# Patient Record
Sex: MALE | Race: WHITE | NOT HISPANIC OR LATINO | Employment: OTHER | URBAN - METROPOLITAN AREA
[De-identification: names, ages, dates, MRNs, and addresses within clinical notes are randomized per-mention and may not be internally consistent; named-entity substitution may affect disease eponyms.]

---

## 2017-07-11 ENCOUNTER — FOLLOW UP (OUTPATIENT)
Dept: URBAN - METROPOLITAN AREA CLINIC 33 | Facility: CLINIC | Age: 82
End: 2017-07-11

## 2017-07-11 VITALS
WEIGHT: 160 LBS | DIASTOLIC BLOOD PRESSURE: 62 MMHG | BODY MASS INDEX: 23.7 KG/M2 | HEART RATE: 60 BPM | HEIGHT: 69 IN | SYSTOLIC BLOOD PRESSURE: 112 MMHG

## 2017-07-11 DIAGNOSIS — H01.006: ICD-10-CM

## 2017-07-11 DIAGNOSIS — H35.54: ICD-10-CM

## 2017-07-11 DIAGNOSIS — H35.373: ICD-10-CM

## 2017-07-11 DIAGNOSIS — H02.833: ICD-10-CM

## 2017-07-11 DIAGNOSIS — H25.11: ICD-10-CM

## 2017-07-11 DIAGNOSIS — H02.836: ICD-10-CM

## 2017-07-11 DIAGNOSIS — H01.003: ICD-10-CM

## 2017-07-11 DIAGNOSIS — H35.3112: ICD-10-CM

## 2017-07-11 DIAGNOSIS — H04.123: ICD-10-CM

## 2017-07-11 DIAGNOSIS — H43.812: ICD-10-CM

## 2017-07-11 DIAGNOSIS — H35.3124: ICD-10-CM

## 2017-07-11 PROCEDURE — 92250 FUNDUS PHOTOGRAPHY W/I&R: CPT

## 2017-07-11 PROCEDURE — 2019F DILATED MACUL EXAM DONE: CPT

## 2017-07-11 PROCEDURE — 4177F TALK PT/CRGVR RE AREDS PREV: CPT

## 2017-07-11 PROCEDURE — 92134 CPTRZ OPH DX IMG PST SGM RTA: CPT

## 2017-07-11 PROCEDURE — 1036F TOBACCO NON-USER: CPT

## 2017-07-11 PROCEDURE — 92014 COMPRE OPH EXAM EST PT 1/>: CPT

## 2017-07-11 PROCEDURE — G8420 CALC BMI NORM PARAMETERS: HCPCS

## 2017-07-11 ASSESSMENT — VISUAL ACUITY
OS_SC: 20/200
OS_PH: 20/100+2
OD_PH: 20/80+1
OD_SC: 20/100+1

## 2017-07-11 ASSESSMENT — TONOMETRY
OS_IOP_MMHG: 16
OD_IOP_MMHG: 15

## 2017-09-18 ENCOUNTER — FOLLOW UP (OUTPATIENT)
Dept: URBAN - METROPOLITAN AREA CLINIC 33 | Facility: CLINIC | Age: 82
End: 2017-09-18

## 2017-09-18 VITALS — HEART RATE: 60 BPM | DIASTOLIC BLOOD PRESSURE: 62 MMHG | HEIGHT: 60 IN | SYSTOLIC BLOOD PRESSURE: 98 MMHG

## 2017-09-18 DIAGNOSIS — H01.006: ICD-10-CM

## 2017-09-18 DIAGNOSIS — H25.11: ICD-10-CM

## 2017-09-18 DIAGNOSIS — H02.833: ICD-10-CM

## 2017-09-18 DIAGNOSIS — H02.836: ICD-10-CM

## 2017-09-18 DIAGNOSIS — H04.123: ICD-10-CM

## 2017-09-18 DIAGNOSIS — H43.812: ICD-10-CM

## 2017-09-18 DIAGNOSIS — H35.54: ICD-10-CM

## 2017-09-18 DIAGNOSIS — H35.373: ICD-10-CM

## 2017-09-18 DIAGNOSIS — H01.003: ICD-10-CM

## 2017-09-18 DIAGNOSIS — H35.3221: ICD-10-CM

## 2017-09-18 DIAGNOSIS — H35.3112: ICD-10-CM

## 2017-09-18 PROCEDURE — 92250 FUNDUS PHOTOGRAPHY W/I&R: CPT

## 2017-09-18 PROCEDURE — 67028 INJECTION EYE DRUG: CPT

## 2017-09-18 PROCEDURE — 1036F TOBACCO NON-USER: CPT

## 2017-09-18 PROCEDURE — 4177F TALK PT/CRGVR RE AREDS PREV: CPT

## 2017-09-18 PROCEDURE — G8427 DOCREV CUR MEDS BY ELIG CLIN: HCPCS

## 2017-09-18 PROCEDURE — 2019F DILATED MACUL EXAM DONE: CPT

## 2017-09-18 PROCEDURE — 92014 COMPRE OPH EXAM EST PT 1/>: CPT

## 2017-09-18 ASSESSMENT — TONOMETRY
OS_IOP_MMHG: 12
OD_IOP_MMHG: 15

## 2017-09-18 ASSESSMENT — VISUAL ACUITY
OS_PH: 20/100+2
OS_SC: 20/200+2
OD_SC: 20/100+1
OD_PH: 20/60-2

## 2017-10-23 ENCOUNTER — FOLLOW UP (OUTPATIENT)
Dept: URBAN - METROPOLITAN AREA CLINIC 33 | Facility: CLINIC | Age: 82
End: 2017-10-23

## 2017-10-23 VITALS — HEART RATE: 66 BPM | SYSTOLIC BLOOD PRESSURE: 102 MMHG | DIASTOLIC BLOOD PRESSURE: 54 MMHG | HEIGHT: 60 IN

## 2017-10-23 DIAGNOSIS — H04.123: ICD-10-CM

## 2017-10-23 DIAGNOSIS — H35.373: ICD-10-CM

## 2017-10-23 DIAGNOSIS — H43.812: ICD-10-CM

## 2017-10-23 DIAGNOSIS — H35.54: ICD-10-CM

## 2017-10-23 DIAGNOSIS — H25.11: ICD-10-CM

## 2017-10-23 DIAGNOSIS — H02.836: ICD-10-CM

## 2017-10-23 DIAGNOSIS — H02.833: ICD-10-CM

## 2017-10-23 DIAGNOSIS — H35.3112: ICD-10-CM

## 2017-10-23 DIAGNOSIS — H01.003: ICD-10-CM

## 2017-10-23 DIAGNOSIS — H01.006: ICD-10-CM

## 2017-10-23 DIAGNOSIS — H35.3221: ICD-10-CM

## 2017-10-23 PROCEDURE — 92012 INTRM OPH EXAM EST PATIENT: CPT

## 2017-10-23 PROCEDURE — 67028 INJECTION EYE DRUG: CPT

## 2017-10-23 PROCEDURE — 2019F DILATED MACUL EXAM DONE: CPT

## 2017-10-23 PROCEDURE — G8427 DOCREV CUR MEDS BY ELIG CLIN: HCPCS

## 2017-10-23 PROCEDURE — 92250 FUNDUS PHOTOGRAPHY W/I&R: CPT

## 2017-10-23 PROCEDURE — 4177F TALK PT/CRGVR RE AREDS PREV: CPT

## 2017-10-23 ASSESSMENT — TONOMETRY
OS_IOP_MMHG: 12
OD_IOP_MMHG: 14

## 2017-10-23 ASSESSMENT — VISUAL ACUITY
OS_SC: 20/200+1
OD_SC: 20/100+1
OS_PH: 20/200+2
OD_PH: 20/60+1

## 2017-12-04 ENCOUNTER — CLINICAL PROCEDURE AND DIAGNOSTIC TESTING ONLY (OUTPATIENT)
Dept: URBAN - METROPOLITAN AREA CLINIC 33 | Facility: CLINIC | Age: 82
End: 2017-12-04

## 2017-12-04 DIAGNOSIS — H35.3221: ICD-10-CM

## 2017-12-04 DIAGNOSIS — H35.3112: ICD-10-CM

## 2017-12-04 PROCEDURE — 92134 CPTRZ OPH DX IMG PST SGM RTA: CPT

## 2017-12-04 PROCEDURE — 67028 INJECTION EYE DRUG: CPT

## 2017-12-04 ASSESSMENT — VISUAL ACUITY
OS_SC: 20/200-1
OD_SC: 20/80+1
OS_PH: 20/200+1

## 2017-12-04 ASSESSMENT — TONOMETRY
OS_IOP_MMHG: 14
OD_IOP_MMHG: 12

## 2018-01-15 ENCOUNTER — CLINICAL PROCEDURE AND DIAGNOSTIC TESTING ONLY (OUTPATIENT)
Dept: URBAN - METROPOLITAN AREA CLINIC 33 | Facility: CLINIC | Age: 83
End: 2018-01-15

## 2018-01-15 DIAGNOSIS — H35.3221: ICD-10-CM

## 2018-01-15 DIAGNOSIS — H35.3112: ICD-10-CM

## 2018-01-15 PROCEDURE — 67028 INJECTION EYE DRUG: CPT

## 2018-01-15 PROCEDURE — 92134 CPTRZ OPH DX IMG PST SGM RTA: CPT

## 2018-01-15 ASSESSMENT — TONOMETRY
OS_IOP_MMHG: 13
OD_IOP_MMHG: 14

## 2018-01-15 ASSESSMENT — VISUAL ACUITY
OD_SC: 20/80-2
OS_SC: 20/200-2

## 2018-02-19 ENCOUNTER — FOLLOW UP AND POST INJECTION EVALUATION (OUTPATIENT)
Dept: URBAN - METROPOLITAN AREA CLINIC 33 | Facility: CLINIC | Age: 83
End: 2018-02-19

## 2018-02-19 VITALS
SYSTOLIC BLOOD PRESSURE: 109 MMHG | HEART RATE: 58 BPM | HEIGHT: 69 IN | WEIGHT: 164 LBS | DIASTOLIC BLOOD PRESSURE: 76 MMHG | BODY MASS INDEX: 24.29 KG/M2

## 2018-02-19 DIAGNOSIS — H35.3221: ICD-10-CM

## 2018-02-19 DIAGNOSIS — H35.3112: ICD-10-CM

## 2018-02-19 DIAGNOSIS — H35.373: ICD-10-CM

## 2018-02-19 DIAGNOSIS — H35.54: ICD-10-CM

## 2018-02-19 DIAGNOSIS — H43.812: ICD-10-CM

## 2018-02-19 PROCEDURE — 92014 COMPRE OPH EXAM EST PT 1/>: CPT

## 2018-02-19 PROCEDURE — 67028 INJECTION EYE DRUG: CPT

## 2018-02-19 PROCEDURE — 92134 CPTRZ OPH DX IMG PST SGM RTA: CPT

## 2018-02-19 PROCEDURE — 92250 FUNDUS PHOTOGRAPHY W/I&R: CPT

## 2018-02-19 ASSESSMENT — VISUAL ACUITY
OD_SC: 20/80-1
OS_SC: 20/200-1

## 2018-02-19 ASSESSMENT — TONOMETRY
OS_IOP_MMHG: 12
OD_IOP_MMHG: 13

## 2018-04-03 ENCOUNTER — PROCEDURE ONLY (OUTPATIENT)
Dept: URBAN - METROPOLITAN AREA CLINIC 33 | Facility: CLINIC | Age: 83
End: 2018-04-03

## 2018-04-03 DIAGNOSIS — H35.3112: ICD-10-CM

## 2018-04-03 DIAGNOSIS — H35.3221: ICD-10-CM

## 2018-04-03 PROCEDURE — 67028 INJECTION EYE DRUG: CPT

## 2018-04-03 PROCEDURE — 92134 CPTRZ OPH DX IMG PST SGM RTA: CPT

## 2018-04-03 ASSESSMENT — VISUAL ACUITY
OS_SC: 20/200+1
OD_SC: 20/100+2

## 2018-04-03 ASSESSMENT — TONOMETRY
OS_IOP_MMHG: 12
OD_IOP_MMHG: 12

## 2018-05-15 ENCOUNTER — FOLLOW UP AND POST INJECTION EVALUATION (OUTPATIENT)
Dept: URBAN - METROPOLITAN AREA CLINIC 33 | Facility: CLINIC | Age: 83
End: 2018-05-15

## 2018-05-15 VITALS — DIASTOLIC BLOOD PRESSURE: 81 MMHG | HEIGHT: 60 IN | HEART RATE: 65 BPM | SYSTOLIC BLOOD PRESSURE: 127 MMHG

## 2018-05-15 DIAGNOSIS — H43.812: ICD-10-CM

## 2018-05-15 DIAGNOSIS — H35.3112: ICD-10-CM

## 2018-05-15 DIAGNOSIS — H35.373: ICD-10-CM

## 2018-05-15 DIAGNOSIS — H35.3221: ICD-10-CM

## 2018-05-15 DIAGNOSIS — H35.54: ICD-10-CM

## 2018-05-15 PROCEDURE — 92250 FUNDUS PHOTOGRAPHY W/I&R: CPT

## 2018-05-15 PROCEDURE — 67028 INJECTION EYE DRUG: CPT

## 2018-05-15 PROCEDURE — 92012 INTRM OPH EXAM EST PATIENT: CPT

## 2018-05-15 PROCEDURE — 92134 CPTRZ OPH DX IMG PST SGM RTA: CPT

## 2018-05-15 ASSESSMENT — TONOMETRY
OS_IOP_MMHG: 10
OD_IOP_MMHG: 11

## 2018-05-15 ASSESSMENT — VISUAL ACUITY
OS_SC: 20/200-1
OD_SC: 20/80

## 2018-06-26 ENCOUNTER — CLINICAL PROCEDURE AND DIAGNOSTIC TESTING ONLY (OUTPATIENT)
Dept: URBAN - METROPOLITAN AREA CLINIC 33 | Facility: CLINIC | Age: 83
End: 2018-06-26

## 2018-06-26 DIAGNOSIS — H35.3112: ICD-10-CM

## 2018-06-26 DIAGNOSIS — H35.3221: ICD-10-CM

## 2018-06-26 PROCEDURE — 67028 INJECTION EYE DRUG: CPT

## 2018-06-26 PROCEDURE — 92134 CPTRZ OPH DX IMG PST SGM RTA: CPT

## 2018-06-26 ASSESSMENT — VISUAL ACUITY
OS_PH: 20/200-1
OD_SC: 20/80-2
OD_PH: 20/50-2
OS_SC: 20/400+1

## 2018-06-26 ASSESSMENT — TONOMETRY
OD_IOP_MMHG: 12
OS_IOP_MMHG: 10

## 2018-08-15 ENCOUNTER — FOLLOW UP AND POST INJECTION EVALUATION (OUTPATIENT)
Dept: URBAN - METROPOLITAN AREA CLINIC 33 | Facility: CLINIC | Age: 83
End: 2018-08-15

## 2018-08-15 VITALS
DIASTOLIC BLOOD PRESSURE: 74 MMHG | HEIGHT: 70 IN | SYSTOLIC BLOOD PRESSURE: 120 MMHG | HEART RATE: 56 BPM | WEIGHT: 161 LBS | BODY MASS INDEX: 23.05 KG/M2

## 2018-08-15 DIAGNOSIS — H43.812: ICD-10-CM

## 2018-08-15 DIAGNOSIS — H35.373: ICD-10-CM

## 2018-08-15 DIAGNOSIS — H35.54: ICD-10-CM

## 2018-08-15 DIAGNOSIS — H35.3112: ICD-10-CM

## 2018-08-15 DIAGNOSIS — H35.3221: ICD-10-CM

## 2018-08-15 PROCEDURE — 92134 CPTRZ OPH DX IMG PST SGM RTA: CPT

## 2018-08-15 PROCEDURE — 92014 COMPRE OPH EXAM EST PT 1/>: CPT

## 2018-08-15 PROCEDURE — 67028 INJECTION EYE DRUG: CPT

## 2018-08-15 PROCEDURE — 92250 FUNDUS PHOTOGRAPHY W/I&R: CPT

## 2018-08-15 ASSESSMENT — TONOMETRY
OD_IOP_MMHG: 14
OS_IOP_MMHG: 14

## 2018-08-15 ASSESSMENT — VISUAL ACUITY
OD_CC: 20/30-1
OS_CC: 20/200+1
OD_SC: 20/80+2
OS_SC: 20/200-1

## 2018-10-03 ENCOUNTER — CLINICAL PROCEDURE AND DIAGNOSTIC TESTING ONLY (OUTPATIENT)
Dept: URBAN - METROPOLITAN AREA CLINIC 33 | Facility: CLINIC | Age: 83
End: 2018-10-03

## 2018-10-03 DIAGNOSIS — H35.3112: ICD-10-CM

## 2018-10-03 DIAGNOSIS — H35.3221: ICD-10-CM

## 2018-10-03 PROCEDURE — 92134 CPTRZ OPH DX IMG PST SGM RTA: CPT

## 2018-10-03 PROCEDURE — 67028 INJECTION EYE DRUG: CPT

## 2018-10-03 ASSESSMENT — VISUAL ACUITY
OD_CC: 20/30-2
OS_CC: 20/100+1

## 2018-10-03 ASSESSMENT — TONOMETRY
OS_IOP_MMHG: 14
OD_IOP_MMHG: 18

## 2018-11-19 ENCOUNTER — CLINICAL PROCEDURE AND DIAGNOSTIC TESTING ONLY (OUTPATIENT)
Dept: URBAN - METROPOLITAN AREA CLINIC 33 | Facility: CLINIC | Age: 83
End: 2018-11-19

## 2018-11-19 DIAGNOSIS — H35.3221: ICD-10-CM

## 2018-11-19 DIAGNOSIS — H35.3112: ICD-10-CM

## 2018-11-19 PROCEDURE — 92134 CPTRZ OPH DX IMG PST SGM RTA: CPT

## 2018-11-19 PROCEDURE — 67028 INJECTION EYE DRUG: CPT

## 2018-11-19 ASSESSMENT — VISUAL ACUITY
OD_CC: 20/40+2
OS_CC: 20/100

## 2018-11-19 ASSESSMENT — TONOMETRY
OD_IOP_MMHG: 16
OS_IOP_MMHG: 14

## 2019-01-07 ENCOUNTER — CLINICAL PROCEDURE AND DIAGNOSTIC TESTING ONLY (OUTPATIENT)
Dept: URBAN - METROPOLITAN AREA CLINIC 33 | Facility: CLINIC | Age: 84
End: 2019-01-07

## 2019-01-07 DIAGNOSIS — H35.3221: ICD-10-CM

## 2019-01-07 DIAGNOSIS — H35.3112: ICD-10-CM

## 2019-01-07 PROCEDURE — 67028 INJECTION EYE DRUG: CPT

## 2019-01-07 PROCEDURE — 92134 CPTRZ OPH DX IMG PST SGM RTA: CPT

## 2019-01-07 ASSESSMENT — TONOMETRY
OD_IOP_MMHG: 15
OS_IOP_MMHG: 16

## 2019-01-07 ASSESSMENT — VISUAL ACUITY
OS_CC: 20/200+1
OD_CC: 20/40+1

## 2019-02-25 ENCOUNTER — FOLLOW UP AND POST INJECTION EVALUATION (OUTPATIENT)
Dept: URBAN - METROPOLITAN AREA CLINIC 33 | Facility: CLINIC | Age: 84
End: 2019-02-25

## 2019-02-25 VITALS — BODY MASS INDEX: 23.62 KG/M2 | HEIGHT: 70 IN | WEIGHT: 165 LBS

## 2019-02-25 DIAGNOSIS — H35.3112: ICD-10-CM

## 2019-02-25 DIAGNOSIS — H43.812: ICD-10-CM

## 2019-02-25 DIAGNOSIS — H35.54: ICD-10-CM

## 2019-02-25 DIAGNOSIS — H35.373: ICD-10-CM

## 2019-02-25 DIAGNOSIS — H35.3221: ICD-10-CM

## 2019-02-25 PROCEDURE — 92014 COMPRE OPH EXAM EST PT 1/>: CPT

## 2019-02-25 PROCEDURE — 92250 FUNDUS PHOTOGRAPHY W/I&R: CPT

## 2019-02-25 PROCEDURE — 92134 CPTRZ OPH DX IMG PST SGM RTA: CPT

## 2019-02-25 PROCEDURE — 67028 INJECTION EYE DRUG: CPT

## 2019-02-25 ASSESSMENT — VISUAL ACUITY
OD_CC: 20/40+2
OS_CC: 20/200+2

## 2019-02-25 ASSESSMENT — TONOMETRY
OD_IOP_MMHG: 15
OS_IOP_MMHG: 12

## 2019-04-22 ENCOUNTER — CLINICAL PROCEDURE AND DIAGNOSTIC TESTING ONLY (OUTPATIENT)
Dept: URBAN - METROPOLITAN AREA CLINIC 33 | Facility: CLINIC | Age: 84
End: 2019-04-22

## 2019-04-22 DIAGNOSIS — H35.3112: ICD-10-CM

## 2019-04-22 DIAGNOSIS — H35.3221: ICD-10-CM

## 2019-04-22 PROCEDURE — 92134 CPTRZ OPH DX IMG PST SGM RTA: CPT

## 2019-04-22 PROCEDURE — 67028 INJECTION EYE DRUG: CPT

## 2019-04-22 ASSESSMENT — TONOMETRY
OD_IOP_MMHG: 16
OS_IOP_MMHG: 15

## 2019-04-22 ASSESSMENT — VISUAL ACUITY
OD_CC: 20/50-1
OS_CC: 20/200

## 2019-06-24 ENCOUNTER — FOLLOW UP AND POST INJECTION EVALUATION (OUTPATIENT)
Dept: URBAN - METROPOLITAN AREA CLINIC 33 | Facility: CLINIC | Age: 84
End: 2019-06-24

## 2019-06-24 DIAGNOSIS — H02.836: ICD-10-CM

## 2019-06-24 DIAGNOSIS — H04.123: ICD-10-CM

## 2019-06-24 DIAGNOSIS — H01.006: ICD-10-CM

## 2019-06-24 DIAGNOSIS — H35.3112: ICD-10-CM

## 2019-06-24 DIAGNOSIS — H01.003: ICD-10-CM

## 2019-06-24 DIAGNOSIS — H35.373: ICD-10-CM

## 2019-06-24 DIAGNOSIS — H02.833: ICD-10-CM

## 2019-06-24 DIAGNOSIS — H35.54: ICD-10-CM

## 2019-06-24 DIAGNOSIS — H25.11: ICD-10-CM

## 2019-06-24 DIAGNOSIS — H35.3221: ICD-10-CM

## 2019-06-24 DIAGNOSIS — H43.812: ICD-10-CM

## 2019-06-24 PROCEDURE — 92134 CPTRZ OPH DX IMG PST SGM RTA: CPT

## 2019-06-24 PROCEDURE — 92014 COMPRE OPH EXAM EST PT 1/>: CPT

## 2019-06-24 PROCEDURE — 67028 INJECTION EYE DRUG: CPT

## 2019-06-24 PROCEDURE — 92250 FUNDUS PHOTOGRAPHY W/I&R: CPT

## 2019-06-24 ASSESSMENT — VISUAL ACUITY
OD_PH: 20/60+2
OS_SC: 20/200+2
OS_CC: 20/100-2
OD_CC: 20/80-2
OD_SC: 20/100+2

## 2019-06-24 ASSESSMENT — TONOMETRY
OS_IOP_MMHG: 13
OD_IOP_MMHG: 15

## 2019-08-05 ENCOUNTER — CLINICAL PROCEDURE AND DIAGNOSTIC TESTING ONLY (OUTPATIENT)
Dept: URBAN - METROPOLITAN AREA CLINIC 33 | Facility: CLINIC | Age: 84
End: 2019-08-05

## 2019-08-05 DIAGNOSIS — H35.3221: ICD-10-CM

## 2019-08-05 DIAGNOSIS — H35.3112: ICD-10-CM

## 2019-08-05 PROCEDURE — 67028 INJECTION EYE DRUG: CPT

## 2019-08-05 PROCEDURE — 92134 CPTRZ OPH DX IMG PST SGM RTA: CPT

## 2019-08-05 ASSESSMENT — VISUAL ACUITY
OD_CC: 20/60
OS_CC: 20/200

## 2019-08-05 ASSESSMENT — TONOMETRY
OS_IOP_MMHG: 12
OD_IOP_MMHG: 14
OD_IOP_MMHG: 16

## 2019-10-07 ENCOUNTER — CLINICAL PROCEDURE AND DIAGNOSTIC TESTING ONLY (OUTPATIENT)
Dept: URBAN - METROPOLITAN AREA CLINIC 33 | Facility: CLINIC | Age: 84
End: 2019-10-07

## 2019-10-07 DIAGNOSIS — H35.3221: ICD-10-CM

## 2019-10-07 DIAGNOSIS — H35.3112: ICD-10-CM

## 2019-10-07 PROCEDURE — 67028 INJECTION EYE DRUG: CPT

## 2019-10-07 PROCEDURE — 92134 CPTRZ OPH DX IMG PST SGM RTA: CPT

## 2019-10-07 ASSESSMENT — VISUAL ACUITY
OS_CC: 20/70
OD_CC: 20/60

## 2019-10-07 ASSESSMENT — TONOMETRY
OD_IOP_MMHG: 16
OS_IOP_MMHG: 16

## 2019-11-25 ENCOUNTER — CLINICAL PROCEDURE AND DIAGNOSTIC TESTING ONLY (OUTPATIENT)
Dept: URBAN - METROPOLITAN AREA CLINIC 33 | Facility: CLINIC | Age: 84
End: 2019-11-25

## 2019-11-25 DIAGNOSIS — H35.3112: ICD-10-CM

## 2019-11-25 DIAGNOSIS — H35.3221: ICD-10-CM

## 2019-11-25 PROCEDURE — 67028 INJECTION EYE DRUG: CPT

## 2019-11-25 PROCEDURE — 92250 FUNDUS PHOTOGRAPHY W/I&R: CPT

## 2019-11-25 ASSESSMENT — VISUAL ACUITY
OS_CC: 20/70
OD_CC: 20/60

## 2019-11-25 ASSESSMENT — TONOMETRY
OD_IOP_MMHG: 15
OS_IOP_MMHG: 17

## 2020-01-13 ENCOUNTER — CLINICAL PROCEDURE AND DIAGNOSTIC TESTING ONLY (OUTPATIENT)
Dept: URBAN - METROPOLITAN AREA CLINIC 33 | Facility: CLINIC | Age: 85
End: 2020-01-13

## 2020-01-13 VITALS — WEIGHT: 165 LBS | BODY MASS INDEX: 23.62 KG/M2 | HEIGHT: 70 IN

## 2020-01-13 DIAGNOSIS — H35.3112: ICD-10-CM

## 2020-01-13 DIAGNOSIS — H35.3221: ICD-10-CM

## 2020-01-13 DIAGNOSIS — H35.373: ICD-10-CM

## 2020-01-13 PROCEDURE — 67028 INJECTION EYE DRUG: CPT

## 2020-01-13 PROCEDURE — 92250 FUNDUS PHOTOGRAPHY W/I&R: CPT

## 2020-01-13 PROCEDURE — 92134 CPTRZ OPH DX IMG PST SGM RTA: CPT

## 2020-01-13 ASSESSMENT — TONOMETRY
OS_IOP_MMHG: 15
OD_IOP_MMHG: 15

## 2020-01-13 ASSESSMENT — VISUAL ACUITY
OS_CC: 20/200+2
OD_CC: 20/40-2

## 2020-03-03 ENCOUNTER — CLINICAL PROCEDURE AND DIAGNOSTIC TESTING ONLY (OUTPATIENT)
Dept: URBAN - METROPOLITAN AREA CLINIC 33 | Facility: CLINIC | Age: 85
End: 2020-03-03

## 2020-03-03 DIAGNOSIS — H35.3112: ICD-10-CM

## 2020-03-03 DIAGNOSIS — H35.3221: ICD-10-CM

## 2020-03-03 DIAGNOSIS — H35.373: ICD-10-CM

## 2020-03-03 PROCEDURE — 92134 CPTRZ OPH DX IMG PST SGM RTA: CPT

## 2020-03-03 PROCEDURE — 92250 FUNDUS PHOTOGRAPHY W/I&R: CPT

## 2020-03-03 PROCEDURE — 67028 INJECTION EYE DRUG: CPT

## 2020-03-03 ASSESSMENT — VISUAL ACUITY
OD_CC: 20/40-2
OS_CC: 20/200+1

## 2020-03-03 ASSESSMENT — TONOMETRY
OD_IOP_MMHG: 15
OS_IOP_MMHG: 14

## 2020-05-12 ENCOUNTER — FOLLOW UP AND POST INJECTION EVALUATION (OUTPATIENT)
Dept: URBAN - METROPOLITAN AREA CLINIC 33 | Facility: CLINIC | Age: 85
End: 2020-05-12

## 2020-05-12 DIAGNOSIS — H35.3221: ICD-10-CM

## 2020-05-12 DIAGNOSIS — H35.3112: ICD-10-CM

## 2020-05-12 DIAGNOSIS — H43.812: ICD-10-CM

## 2020-05-12 DIAGNOSIS — H35.373: ICD-10-CM

## 2020-05-12 DIAGNOSIS — H35.54: ICD-10-CM

## 2020-05-12 PROCEDURE — 67028 INJECTION EYE DRUG: CPT

## 2020-05-12 PROCEDURE — 92134 CPTRZ OPH DX IMG PST SGM RTA: CPT

## 2020-05-12 PROCEDURE — 92250 FUNDUS PHOTOGRAPHY W/I&R: CPT

## 2020-05-12 PROCEDURE — 92012 INTRM OPH EXAM EST PATIENT: CPT

## 2020-05-12 ASSESSMENT — TONOMETRY
OS_IOP_MMHG: 11
OD_IOP_MMHG: 12

## 2020-05-12 ASSESSMENT — VISUAL ACUITY
OD_CC: 20/50+2
OS_CC: 20/200-2

## 2020-07-07 ENCOUNTER — CLINICAL PROCEDURE AND DIAGNOSTIC TESTING ONLY (OUTPATIENT)
Dept: URBAN - METROPOLITAN AREA CLINIC 33 | Facility: CLINIC | Age: 85
End: 2020-07-07

## 2020-07-07 DIAGNOSIS — H35.373: ICD-10-CM

## 2020-07-07 DIAGNOSIS — H35.3112: ICD-10-CM

## 2020-07-07 DIAGNOSIS — H35.3221: ICD-10-CM

## 2020-07-07 PROCEDURE — 67028 INJECTION EYE DRUG: CPT

## 2020-07-07 PROCEDURE — 92250 FUNDUS PHOTOGRAPHY W/I&R: CPT

## 2020-07-07 PROCEDURE — 92134 CPTRZ OPH DX IMG PST SGM RTA: CPT

## 2020-07-07 ASSESSMENT — VISUAL ACUITY
OS_CC: 20/200-2
OD_CC: 20/50

## 2020-07-07 ASSESSMENT — TONOMETRY
OS_IOP_MMHG: 10
OD_IOP_MMHG: 11

## 2020-09-01 ENCOUNTER — CLINICAL PROCEDURE AND DIAGNOSTIC TESTING ONLY (OUTPATIENT)
Dept: URBAN - METROPOLITAN AREA CLINIC 33 | Facility: CLINIC | Age: 85
End: 2020-09-01

## 2020-09-01 DIAGNOSIS — H35.3112: ICD-10-CM

## 2020-09-01 DIAGNOSIS — H35.373: ICD-10-CM

## 2020-09-01 DIAGNOSIS — H35.3221: ICD-10-CM

## 2020-09-01 PROCEDURE — 92134 CPTRZ OPH DX IMG PST SGM RTA: CPT

## 2020-09-01 PROCEDURE — 92250 FUNDUS PHOTOGRAPHY W/I&R: CPT

## 2020-09-01 PROCEDURE — 67028 INJECTION EYE DRUG: CPT

## 2020-09-01 ASSESSMENT — TONOMETRY
OD_IOP_MMHG: 15
OS_IOP_MMHG: 13

## 2020-09-01 ASSESSMENT — VISUAL ACUITY
OD_PH: 20/80+2
OS_SC: 20/400+2
OD_SC: 20/100-2
OS_PH: 20/200+2

## 2020-11-04 ENCOUNTER — FOLLOW UP AND POST INJECTION EVALUATION (OUTPATIENT)
Dept: URBAN - METROPOLITAN AREA CLINIC 33 | Facility: CLINIC | Age: 85
End: 2020-11-04

## 2020-11-04 DIAGNOSIS — H43.812: ICD-10-CM

## 2020-11-04 DIAGNOSIS — H35.54: ICD-10-CM

## 2020-11-04 DIAGNOSIS — H35.3221: ICD-10-CM

## 2020-11-04 DIAGNOSIS — H35.3112: ICD-10-CM

## 2020-11-04 DIAGNOSIS — H35.373: ICD-10-CM

## 2020-11-04 PROCEDURE — 67028 INJECTION EYE DRUG: CPT

## 2020-11-04 PROCEDURE — 92134 CPTRZ OPH DX IMG PST SGM RTA: CPT

## 2020-11-04 PROCEDURE — 92250 FUNDUS PHOTOGRAPHY W/I&R: CPT

## 2020-11-04 PROCEDURE — 92012 INTRM OPH EXAM EST PATIENT: CPT

## 2020-11-04 ASSESSMENT — VISUAL ACUITY
OD_CC: 20/80+2
OS_CC: 20/400+2

## 2020-11-04 ASSESSMENT — TONOMETRY
OS_IOP_MMHG: 17
OD_IOP_MMHG: 17

## 2021-01-13 ENCOUNTER — CLINICAL PROCEDURE AND DIAGNOSTIC TESTING ONLY (OUTPATIENT)
Dept: URBAN - METROPOLITAN AREA CLINIC 33 | Facility: CLINIC | Age: 86
End: 2021-01-13

## 2021-01-13 DIAGNOSIS — H35.373: ICD-10-CM

## 2021-01-13 DIAGNOSIS — H35.3112: ICD-10-CM

## 2021-01-13 DIAGNOSIS — H35.3221: ICD-10-CM

## 2021-01-13 PROCEDURE — 67028 INJECTION EYE DRUG: CPT

## 2021-01-13 PROCEDURE — 92134 CPTRZ OPH DX IMG PST SGM RTA: CPT

## 2021-01-13 PROCEDURE — 92250 FUNDUS PHOTOGRAPHY W/I&R: CPT

## 2021-03-24 ENCOUNTER — CLINICAL PROCEDURE AND DIAGNOSTIC TESTING ONLY (OUTPATIENT)
Dept: URBAN - METROPOLITAN AREA CLINIC 33 | Facility: CLINIC | Age: 86
End: 2021-03-24

## 2021-03-24 DIAGNOSIS — H35.373: ICD-10-CM

## 2021-03-24 DIAGNOSIS — H35.3221: ICD-10-CM

## 2021-03-24 DIAGNOSIS — H35.3112: ICD-10-CM

## 2021-03-24 PROCEDURE — 92134 CPTRZ OPH DX IMG PST SGM RTA: CPT

## 2021-03-24 PROCEDURE — 92250 FUNDUS PHOTOGRAPHY W/I&R: CPT

## 2021-03-24 PROCEDURE — 67028 INJECTION EYE DRUG: CPT

## 2021-06-02 ENCOUNTER — CLINICAL PROCEDURE AND DIAGNOSTIC TESTING ONLY (OUTPATIENT)
Dept: URBAN - METROPOLITAN AREA CLINIC 33 | Facility: CLINIC | Age: 86
End: 2021-06-02

## 2021-06-02 VITALS — WEIGHT: 170 LBS | HEIGHT: 70 IN | BODY MASS INDEX: 24.34 KG/M2

## 2021-06-02 DIAGNOSIS — H35.3221: ICD-10-CM

## 2021-06-02 DIAGNOSIS — H35.373: ICD-10-CM

## 2021-06-02 DIAGNOSIS — H35.3112: ICD-10-CM

## 2021-06-02 PROCEDURE — 67028 INJECTION EYE DRUG: CPT

## 2021-06-02 PROCEDURE — 92134 CPTRZ OPH DX IMG PST SGM RTA: CPT

## 2021-06-02 PROCEDURE — 92250 FUNDUS PHOTOGRAPHY W/I&R: CPT

## 2021-08-18 ENCOUNTER — FOLLOW UP AND POST INJECTION EVALUATION (OUTPATIENT)
Dept: URBAN - METROPOLITAN AREA CLINIC 33 | Facility: CLINIC | Age: 86
End: 2021-08-18

## 2021-08-18 DIAGNOSIS — H35.3112: ICD-10-CM

## 2021-08-18 DIAGNOSIS — H35.3221: ICD-10-CM

## 2021-08-18 DIAGNOSIS — H04.123: ICD-10-CM

## 2021-08-18 DIAGNOSIS — H35.373: ICD-10-CM

## 2021-08-18 DIAGNOSIS — H35.54: ICD-10-CM

## 2021-08-18 DIAGNOSIS — H43.812: ICD-10-CM

## 2021-08-18 PROCEDURE — 92014 COMPRE OPH EXAM EST PT 1/>: CPT

## 2021-08-18 PROCEDURE — 67028 INJECTION EYE DRUG: CPT

## 2021-08-18 PROCEDURE — 92134 CPTRZ OPH DX IMG PST SGM RTA: CPT

## 2021-08-18 ASSESSMENT — TONOMETRY
OD_IOP_MMHG: 18
OS_IOP_MMHG: 17

## 2021-08-18 ASSESSMENT — VISUAL ACUITY
OS_PH: 20/200-1
OD_CC: 20/60+2
OS_CC: 20/200-2

## 2021-11-10 ENCOUNTER — CLINICAL PROCEDURE AND DIAGNOSTIC TESTING ONLY (OUTPATIENT)
Dept: URBAN - METROPOLITAN AREA CLINIC 33 | Facility: CLINIC | Age: 86
End: 2021-11-10

## 2021-11-10 DIAGNOSIS — H35.3221: ICD-10-CM

## 2021-11-10 DIAGNOSIS — H35.3112: ICD-10-CM

## 2021-11-10 DIAGNOSIS — H35.373: ICD-10-CM

## 2021-11-10 PROCEDURE — 92134 CPTRZ OPH DX IMG PST SGM RTA: CPT

## 2021-11-10 PROCEDURE — 67028 INJECTION EYE DRUG: CPT

## 2021-11-10 PROCEDURE — 92250 FUNDUS PHOTOGRAPHY W/I&R: CPT

## 2022-01-26 ENCOUNTER — CLINIC PROCEDURE ONLY (OUTPATIENT)
Dept: URBAN - METROPOLITAN AREA CLINIC 33 | Facility: CLINIC | Age: 87
End: 2022-01-26

## 2022-01-26 DIAGNOSIS — H35.3221: ICD-10-CM

## 2022-01-26 DIAGNOSIS — H35.373: ICD-10-CM

## 2022-01-26 DIAGNOSIS — H35.3112: ICD-10-CM

## 2022-01-26 PROCEDURE — 92250 FUNDUS PHOTOGRAPHY W/I&R: CPT

## 2022-01-26 PROCEDURE — 67028 INJECTION EYE DRUG: CPT

## 2022-01-26 PROCEDURE — 92134 CPTRZ OPH DX IMG PST SGM RTA: CPT

## 2022-04-13 ENCOUNTER — CLINIC PROCEDURE ONLY (OUTPATIENT)
Dept: URBAN - METROPOLITAN AREA CLINIC 33 | Facility: CLINIC | Age: 87
End: 2022-04-13

## 2022-04-13 DIAGNOSIS — H35.3112: ICD-10-CM

## 2022-04-13 DIAGNOSIS — H35.3221: ICD-10-CM

## 2022-04-13 PROCEDURE — 67028 INJECTION EYE DRUG: CPT

## 2022-04-13 PROCEDURE — 92134 CPTRZ OPH DX IMG PST SGM RTA: CPT

## 2022-04-13 PROCEDURE — 92250 FUNDUS PHOTOGRAPHY W/I&R: CPT

## 2022-06-04 ENCOUNTER — TELEPHONE ENCOUNTER (OUTPATIENT)
Dept: URBAN - METROPOLITAN AREA CLINIC 68 | Facility: CLINIC | Age: 87
End: 2022-06-04

## 2022-06-05 ENCOUNTER — TELEPHONE ENCOUNTER (OUTPATIENT)
Dept: URBAN - METROPOLITAN AREA CLINIC 68 | Facility: CLINIC | Age: 87
End: 2022-06-05

## 2022-06-22 ENCOUNTER — CLINIC PROCEDURE ONLY (OUTPATIENT)
Dept: URBAN - METROPOLITAN AREA CLINIC 33 | Facility: CLINIC | Age: 87
End: 2022-06-22

## 2022-06-22 VITALS
DIASTOLIC BLOOD PRESSURE: 73 MMHG | SYSTOLIC BLOOD PRESSURE: 109 MMHG | BODY MASS INDEX: 24.48 KG/M2 | WEIGHT: 171 LBS | HEIGHT: 70 IN | HEART RATE: 59 BPM

## 2022-06-22 DIAGNOSIS — H35.3221: ICD-10-CM

## 2022-06-22 DIAGNOSIS — H35.3112: ICD-10-CM

## 2022-06-22 PROCEDURE — 67028 INJECTION EYE DRUG: CPT

## 2022-06-22 PROCEDURE — 92134 CPTRZ OPH DX IMG PST SGM RTA: CPT

## 2022-06-22 PROCEDURE — 92250 FUNDUS PHOTOGRAPHY W/I&R: CPT

## 2022-06-25 ENCOUNTER — TELEPHONE ENCOUNTER (OUTPATIENT)
Age: 87
End: 2022-06-25

## 2022-06-26 ENCOUNTER — TELEPHONE ENCOUNTER (OUTPATIENT)
Age: 87
End: 2022-06-26

## 2025-03-14 NOTE — PATIENT DISCUSSION
Hypertension is stable.  Continue current medications.  Blood pressure will be reassessed at the next regular appointment.   Notify Provider: